# Patient Record
Sex: FEMALE
[De-identification: names, ages, dates, MRNs, and addresses within clinical notes are randomized per-mention and may not be internally consistent; named-entity substitution may affect disease eponyms.]

---

## 2017-06-22 ENCOUNTER — HOSPITAL ENCOUNTER (OUTPATIENT)
Dept: HOSPITAL 14 - H.ER | Age: 22
Setting detail: OBSERVATION
Discharge: HOME | End: 2017-06-22
Attending: EMERGENCY MEDICINE | Admitting: EMERGENCY MEDICINE
Payer: COMMERCIAL

## 2017-06-22 VITALS
SYSTOLIC BLOOD PRESSURE: 126 MMHG | HEART RATE: 73 BPM | DIASTOLIC BLOOD PRESSURE: 69 MMHG | RESPIRATION RATE: 16 BRPM | TEMPERATURE: 98.6 F

## 2017-06-22 VITALS — OXYGEN SATURATION: 100 %

## 2017-06-22 DIAGNOSIS — T74.21XA: Primary | ICD-10-CM

## 2017-06-22 LAB
ALBUMIN/GLOB SERPL: 1.2 {RATIO} (ref 1–2.1)
ALP SERPL-CCNC: 74 U/L (ref 38–126)
ALT SERPL-CCNC: 41 U/L (ref 9–52)
AST SERPL-CCNC: 34 U/L (ref 14–36)
BASOPHILS # BLD AUTO: 0.1 K/UL (ref 0–0.2)
BASOPHILS NFR BLD: 0.6 % (ref 0–2)
BILIRUB SERPL-MCNC: 0.3 MG/DL (ref 0.2–1.3)
BUN SERPL-MCNC: 12 MG/DL (ref 7–17)
CALCIUM SERPL-MCNC: 9.9 MG/DL (ref 8.4–10.2)
CHLORIDE SERPL-SCNC: 102 MMOL/L (ref 98–107)
CO2 SERPL-SCNC: 29 MMOL/L (ref 22–30)
EOSINOPHIL # BLD AUTO: 0.1 K/UL (ref 0–0.7)
EOSINOPHIL NFR BLD: 0.6 % (ref 0–4)
ERYTHROCYTE [DISTWIDTH] IN BLOOD BY AUTOMATED COUNT: 14.4 % (ref 11.5–14.5)
GLOBULIN SER-MCNC: 4.3 GM/DL (ref 2.2–3.9)
GLUCOSE SERPL-MCNC: 89 MG/DL (ref 65–105)
HCT VFR BLD CALC: 42.5 % (ref 34–47)
LYMPHOCYTES # BLD AUTO: 2.6 K/UL (ref 1–4.3)
LYMPHOCYTES NFR BLD AUTO: 26 % (ref 20–40)
MCH RBC QN AUTO: 28.4 PG (ref 27–31)
MCHC RBC AUTO-ENTMCNC: 32.7 G/DL (ref 33–37)
MCV RBC AUTO: 86.9 FL (ref 81–99)
MONOCYTES # BLD: 0.7 K/UL (ref 0–0.8)
MONOCYTES NFR BLD: 6.5 % (ref 0–10)
NEUTROPHILS # BLD: 6.6 K/UL (ref 1.8–7)
NEUTROPHILS NFR BLD AUTO: 66.3 % (ref 50–75)
NRBC BLD AUTO-RTO: 0.2 % (ref 0–0)
PLATELET # BLD: 406 K/UL (ref 130–400)
PMV BLD AUTO: 7.8 FL (ref 7.2–11.7)
POTASSIUM SERPL-SCNC: 4.4 MMOL/L (ref 3.6–5)
PROT SERPL-MCNC: 9.6 G/DL (ref 6.3–8.2)
SODIUM SERPL-SCNC: 145 MMOL/L (ref 132–148)
WBC # BLD AUTO: 10 K/UL (ref 4.8–10.8)
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
## 2018-03-19 ENCOUNTER — HOSPITAL ENCOUNTER (EMERGENCY)
Dept: HOSPITAL 14 - H.ER | Age: 23
Discharge: LEFT BEFORE DISCHARGE | End: 2018-03-19
Payer: SELF-PAY

## 2018-03-19 VITALS
TEMPERATURE: 98.3 F | DIASTOLIC BLOOD PRESSURE: 76 MMHG | OXYGEN SATURATION: 99 % | HEART RATE: 78 BPM | RESPIRATION RATE: 17 BRPM | SYSTOLIC BLOOD PRESSURE: 122 MMHG

## 2018-03-19 DIAGNOSIS — Z02.89: Primary | ICD-10-CM

## 2018-03-20 ENCOUNTER — HOSPITAL ENCOUNTER (EMERGENCY)
Dept: HOSPITAL 14 - H.ER | Age: 23
Discharge: HOME | End: 2018-03-20
Payer: SELF-PAY

## 2018-03-20 VITALS
RESPIRATION RATE: 16 BRPM | DIASTOLIC BLOOD PRESSURE: 78 MMHG | SYSTOLIC BLOOD PRESSURE: 118 MMHG | TEMPERATURE: 99 F | OXYGEN SATURATION: 99 % | HEART RATE: 84 BPM

## 2018-03-20 DIAGNOSIS — N39.0: Primary | ICD-10-CM

## 2018-03-20 LAB
BACTERIA #/AREA URNS HPF: (no result) /[HPF]
BILIRUB UR-MCNC: NEGATIVE MG/DL
COLOR UR: YELLOW
GLUCOSE UR STRIP-MCNC: (no result) MG/DL
LEUKOCYTE ESTERASE UR-ACNC: (no result) LEU/UL
PH UR STRIP: 6 [PH] (ref 5–8)
PROT UR STRIP-MCNC: NEGATIVE MG/DL
RBC # UR STRIP: NEGATIVE /UL
SP GR UR STRIP: 1.01 (ref 1–1.03)
SQUAMOUS EPITHIAL: 3 /HPF (ref 0–5)
URINE CLARITY: (no result)
UROBILINOGEN UR-MCNC: (no result) MG/DL (ref 0.2–1)

## 2018-03-20 NOTE — RAD
PROCEDURE:  Radiographs of the Lumbar Spine.



HISTORY:

low back pain







COMPARISON:

No prior.



FINDINGS:



BONES:

Normal alignment. No listhesis. No fracture. No destructive bony 

lesion appreciable.



DISC SPACES:

Unremarkable.



OTHER FINDINGS:

None.



IMPRESSION:

Unremarkable radiographs of the lumbar spine.

## 2018-03-20 NOTE — ED PDOC
HPI: Back


Time Seen by Provider: 03/20/18 04:38


Chief Complaint (Nursing): Back Pain


Chief Complaint (Provider): back pain


History Per: Patient


History/Exam Limitations: no limitations


Onset/Duration Of Symptoms: Hrs (9)


Current Symptoms Are (Timing): Still Present


Exacerbating Factor(s): Standing


Additional Complaint(s): 





23 y/o female presents with low back pain x 9 hours.  Patient states symptoms 

started while cooking, notes pain to radiate down right buttock to right hip.  

Pain worse with standing.  Denies fever, nausea/vomiting, bowel/bladder 

incontinence, numbness/weakness lower extremities.  No medication taken for 

relief thus far.





Past Medical History


Reviewed: Historical Data, Nursing Documentation, Vital Signs


Vital Signs: 


 Last Vital Signs











Temp  99.0 F   03/20/18 04:38


 


Pulse  84   03/20/18 04:38


 


Resp  16   03/20/18 04:38


 


BP  118/78   03/20/18 04:38


 


Pulse Ox  99   03/20/18 04:38














- Medical History


PMH: No Chronic Diseases





- Surgical History


Surgical History: No Surg Hx





- Family History


Family History: States: Unknown Family Hx





- Living Arrangements


Living Arrangements: Alone





- Social History


Alcohol: None


Drugs: Denies





- Immunization History


Hx Tetanus Toxoid Vaccination: No


Hx Influenza Vaccination: No


Hx Pneumococcal Vaccination: No





- Home Medications


Home Medications: 


 Ambulatory Orders











 Medication  Instructions  Recorded


 


Metronidazole [Flagyl] 500 mg PO ONCE #4 tab 06/22/17


 


Ondansetron [Zofran Odt] 4 mg PO ASDIR PRN #10 odt 06/22/17


 


Ciprofloxacin HCl [Cipro] 500 mg PO BID #13 tab 03/20/18


 


Cyclobenzaprine [Cyclobenzaprine 10 mg PO BID PRN #14 tab 03/20/18





HCl]  


 


Naproxen [Naprosyn] 500 mg PO Q12 PRN #20 tablet 03/20/18














- Allergies


Allergies/Adverse Reactions: 


 Allergies











Allergy/AdvReac Type Severity Reaction Status Date / Time


 


No Known Allergies Allergy   Unverified 03/19/18 16:59














Review of Systems


ROS Statement: Except As Marked, All Systems Reviewed And Found Negative


Musculoskeletal: Positive for: Back Pain





Physical Exam





- Reviewed


Nursing Documentation Reviewed: Yes


Vital Signs Reviewed: Yes





- Physical Exam


Appears: Positive for: Well, Non-toxic, No Acute Distress


Head Exam: Positive for: ATRAUMATIC, NORMAL INSPECTION, NORMOCEPHALIC


Skin: Positive for: Normal Color


Eye Exam: Positive for: Normal appearance


ENT: Positive for: Normal ENT Inspection


Cardiovascular/Chest: Positive for: Regular Rate, Rhythm, Gallop


Respiratory: Positive for: Normal Breath Sounds


Gastrointestinal/Abdominal: Positive for: Normal Exam


Back: Positive for: Muscle Spasm (right lspine paraspinals, right gluteal 

tenderness).  Negative for: L CVA Tenderness, R CVA Tenderness, Decreased ROM


Extremity: Positive for: Normal ROM


Neurologic/Psych: Positive for: Alert, Oriented





- ECG


O2 Sat by Pulse Oximetry: 99





- Progress


ED Course And Treament: 





lspine xray, urine, ibuprofen po, flexeril po





Patient educated on findings, discharged with rx Cipro (dose given in ED), 

naproxen, flexeril.


Advised follow up PMD 2-3 days.


Return precautions given.





Disposition





- Clinical Impression


Clinical Impression: 


 Back pain, UTI (urinary tract infection)








- Patient ED Disposition


Is Patient to be Admitted: No


Counseled Patient/Family Regarding: Studies Performed, Diagnosis, Need For 

Followup, Rx Given





- Disposition


Referrals: 


[facility] [Outside]


Disposition: Routine/Home


Disposition Time: 05:50


Condition: IMPROVED


Prescriptions: 


Ciprofloxacin HCl [Cipro] 500 mg PO BID #13 tab


Cyclobenzaprine [Cyclobenzaprine HCl] 10 mg PO BID PRN #14 tab


 PRN Reason: Muscle Spasm


Naproxen [Naprosyn] 500 mg PO Q12 PRN #20 tablet


 PRN Reason: Pain, Moderate (4-7)


Instructions:  Low Back Pain in Adults, Urinary Tract Infection, Adult (DC)